# Patient Record
Sex: MALE | Race: WHITE
[De-identification: names, ages, dates, MRNs, and addresses within clinical notes are randomized per-mention and may not be internally consistent; named-entity substitution may affect disease eponyms.]

---

## 2023-01-06 ENCOUNTER — HOSPITAL ENCOUNTER (OUTPATIENT)
Dept: HOSPITAL 47 - LABPAT | Age: 63
Discharge: HOME | End: 2023-01-06
Attending: ORTHOPAEDIC SURGERY
Payer: COMMERCIAL

## 2023-01-06 DIAGNOSIS — M16.12: ICD-10-CM

## 2023-01-06 DIAGNOSIS — Z01.812: Primary | ICD-10-CM

## 2023-01-06 LAB
ALBUMIN SERPL-MCNC: 4.6 G/DL (ref 3.8–4.9)
ALBUMIN/GLOB SERPL: 2.23 G/DL (ref 1.6–3.17)
ALP SERPL-CCNC: 43 U/L (ref 41–126)
ALT SERPL-CCNC: 29 U/L (ref 10–49)
ANION GAP SERPL CALC-SCNC: 8.9 MMOL/L (ref 10–18)
APTT BLD: 24.3 SEC (ref 22–30)
AST SERPL-CCNC: 21 U/L (ref 14–35)
BUN SERPL-SCNC: 18.9 MG/DL (ref 9–27)
BUN/CREAT SERPL: 19.23 RATIO (ref 12–20)
CALCIUM SPEC-MCNC: 9.4 MG/DL (ref 8.7–10.3)
CHLORIDE SERPL-SCNC: 104 MMOL/L (ref 96–109)
CO2 SERPL-SCNC: 28.7 MMOL/L (ref 20–27.5)
ERYTHROCYTE [DISTWIDTH] IN BLOOD BY AUTOMATED COUNT: 4.57 X 10*6/UL (ref 4.4–5.6)
ERYTHROCYTE [DISTWIDTH] IN BLOOD: 13.2 % (ref 11.5–14.5)
GLOBULIN SER CALC-MCNC: 2.1 G/DL (ref 1.6–3.3)
GLUCOSE SERPL-MCNC: 79 MG/DL (ref 70–110)
HCT VFR BLD AUTO: 45.1 % (ref 39.6–50)
HGB BLD-MCNC: 14.6 G/DL (ref 13–17)
INR PPP: 1 (ref ?–1.2)
MCH RBC QN AUTO: 31.9 PG (ref 27–32)
MCHC RBC AUTO-ENTMCNC: 32.4 G/DL (ref 32–37)
MCV RBC AUTO: 98.7 FL (ref 80–97)
NRBC BLD AUTO-RTO: 0 /100 WBCS (ref 0–0)
PLATELET # BLD AUTO: 218 X 10*3/UL (ref 140–440)
POTASSIUM SERPL-SCNC: 4.4 MMOL/L (ref 3.5–5.5)
PROT SERPL-MCNC: 6.7 G/DL (ref 6.2–8.2)
PT BLD: 10.3 SEC (ref 9–12)
SODIUM SERPL-SCNC: 141 MMOL/L (ref 135–145)
WBC # BLD AUTO: 9.11 X 10*3/UL (ref 4.5–10)

## 2023-01-06 PROCEDURE — 85610 PROTHROMBIN TIME: CPT

## 2023-01-06 PROCEDURE — 87070 CULTURE OTHR SPECIMN AEROBIC: CPT

## 2023-01-06 PROCEDURE — 85027 COMPLETE CBC AUTOMATED: CPT

## 2023-01-06 PROCEDURE — 85730 THROMBOPLASTIN TIME PARTIAL: CPT

## 2023-01-06 PROCEDURE — 81003 URINALYSIS AUTO W/O SCOPE: CPT

## 2023-01-06 PROCEDURE — 80053 COMPREHEN METABOLIC PANEL: CPT

## 2023-01-07 LAB
PH UR: 6 [PH] (ref 5–8)
SP GR UR: 1.03 (ref 1–1.03)
UROBILINOGEN UR QL: 0.2

## 2023-01-16 ENCOUNTER — HOSPITAL ENCOUNTER (OUTPATIENT)
Dept: HOSPITAL 47 - OR | Age: 63
Discharge: HOME HEALTH | End: 2023-01-16
Attending: ORTHOPAEDIC SURGERY
Payer: COMMERCIAL

## 2023-01-16 VITALS — SYSTOLIC BLOOD PRESSURE: 132 MMHG | DIASTOLIC BLOOD PRESSURE: 75 MMHG | HEART RATE: 79 BPM

## 2023-01-16 VITALS — RESPIRATION RATE: 16 BRPM

## 2023-01-16 VITALS — TEMPERATURE: 97.4 F

## 2023-01-16 DIAGNOSIS — E78.5: ICD-10-CM

## 2023-01-16 DIAGNOSIS — G89.18: ICD-10-CM

## 2023-01-16 DIAGNOSIS — Z79.82: ICD-10-CM

## 2023-01-16 DIAGNOSIS — M16.12: Primary | ICD-10-CM

## 2023-01-16 DIAGNOSIS — N40.0: ICD-10-CM

## 2023-01-16 DIAGNOSIS — Z96.642: ICD-10-CM

## 2023-01-16 DIAGNOSIS — G47.33: ICD-10-CM

## 2023-01-16 DIAGNOSIS — Z79.899: ICD-10-CM

## 2023-01-16 DIAGNOSIS — E66.9: ICD-10-CM

## 2023-01-16 DIAGNOSIS — Z87.891: ICD-10-CM

## 2023-01-16 PROCEDURE — 97161 PT EVAL LOW COMPLEX 20 MIN: CPT

## 2023-01-16 PROCEDURE — 73501 X-RAY EXAM HIP UNI 1 VIEW: CPT

## 2023-01-16 PROCEDURE — 64447 NJX AA&/STRD FEMORAL NRV IMG: CPT

## 2023-01-16 PROCEDURE — 27130 TOTAL HIP ARTHROPLASTY: CPT

## 2023-01-16 PROCEDURE — 86901 BLOOD TYPING SEROLOGIC RH(D): CPT

## 2023-01-16 PROCEDURE — 97530 THERAPEUTIC ACTIVITIES: CPT

## 2023-01-16 PROCEDURE — 76942 ECHO GUIDE FOR BIOPSY: CPT

## 2023-01-16 PROCEDURE — 86850 RBC ANTIBODY SCREEN: CPT

## 2023-01-16 PROCEDURE — 86900 BLOOD TYPING SEROLOGIC ABO: CPT

## 2023-01-16 PROCEDURE — 88300 SURGICAL PATH GROSS: CPT

## 2023-01-16 NOTE — P.OP
Date of Procedure: 01/16/23


Preoperative Diagnosis: 


Severe osteoarthritis left hip


Postoperative Diagnosis: 


Severe osteoarthritis left hip


Procedure(s) Performed: 


Left total hip arthroplasty direct anterior approach


Implants: 


[employer] Polarstem standard size 9


[employer] R3, 3 hole hemispherical acetabular shell, 58 mm


[employer] Reflection 6.5 mm cancellus screw, 20 mm, 25 mm


[employer] R3, XLPE 20 acetabular liner 


[employer] Nephew Oxinium femoral head 36 m, +4


All components were press-fit.


The articulation is Oxinium on polyethylene.


Anesthesia: GETA


Surgeon: Bubba Fowler


Assistant #1: Coby Martinez


Estimated Blood Loss (ml): 500


Pathology: other (Femoral head)


Condition: stable


Disposition: PACU


Indications for Procedure: 


After failure of conservative treatment we discussed the surgical and 

nonsurgical treatment options at length.  Patient wishes to proceed with a total

hip arthroplasty with a direct anterior approach.  Complications specific to 

this procedure were discussed at length, including but not limited to infection,

leg length discrepancy, dislocation, nerve injury, and fracture.  Covid-19 was 

also discussed at length with the patient, and they are aware of the current 

policies and procedures.  The patient was given the option of delaying surgery, 

but they elect to proceed knowing these risks.  Patient is aware of all these 

complications and informed consent was obtained


Operative Findings: 


The operative findings are consistent with severe osteoarthritis of the left hip


Description of Procedure: 


The patient was seen and evaluated in the preoperative area and the consent was 

reviewed.  The operative site was marked with a skin marker.  The patient 

verified the procedure and operative site.  A PENG block was placed by anesth

esia in the preoperative area. The patient was then brought to the operating 

room and given preoperative antibiotics intravenously.  1 g of Tranexamic acid 

was also given intravenously.  A general anesthetic was administered by the 

anesthesia department.   The patient was then placed on the [place] table with the 

bony prominences well-padded.  The hip area was then prepped with a ChloraPrep 

solution and draped in the usual sterile fashion.





A universal timeout was then performed, which confirmed the patient's name, 

surgical site, ALLERGIES, and procedure being performed on the consent.  Next 

the incision site was located at 1 cm distal and 4 cm lateral to the anterior 

superior iliac spine.  The skin and subcutaneous tissues were sharply incised.  

Incision was carefully dissected down to the fascia overlying the tensor fascia 

sanjana muscle.  This fascia was then incised in line with the muscle fibers.  Care

was taken to stay laterally in order to avoid injuring the lateral femoral 

cutaneous nerve.  Next, using blunt finger dissection, the tensor fascia sanjana 

muscle was dissected off its investing fascia.  The muscle was then carefully 

retracted laterally with a cobra retractor over the lateral neck of the femur.  

Next, the circumflex vessels were identified and cauterized using the Aquamantis

device.  The anterior hip capsule was then exposed.  The capsule was then opened

and an inverted T fashion.  The retractors were then placed intracapsularly.  

The retractors were maintained intracapsular throughout the procedure.  The 

proximal femur was then visualized.  Fluoroscopic x-rays were then taken in 

order to evaluate the preoperative leg lengths.  A small amount of traction was 

placed on the leg.





The femoral neck was then osteotomized at the appropriate level above the lesser

trochanter.  A small wedge of bone was then removed from the remaining femoral 

head.  Next, using a corkscrew the femoral head was removed from the acetabulum.

 On gross visual inspection, the femoral head had complete loss of articular 

cartilage and multiple periarticular osteophytes.  The femoral head was then 

measured.  Attention was then turned to the acetabulum.





The acetabulum was exposed and any remaining labrum was excised.  Sequential 

reaming of the acetabulum was performed using fluoroscopic guidance until there 

was a good bed of bleeding cancellus bone.  When the appropriate size was 

reached, a trial was then placed.  The position and fit of the trial was checked

with fluoroscopy.  The trial was then removed.  Then, using fluoroscopic 

guidance, the final implant was impacted at 20 of anteversion and 40 of 

abduction, and fully seated in the acetabulum.  2 screws were then placed in the

acetabulum.  Again fluoroscopy was used to check position of the screws.  Next, 

the liner was then impacted, with a 20 elevated liner located in the anterior 

superior quadrant.  Component locking was confirmed.





Attention was then directed to the femur.  With the aid of the [place] table, the 

femur was externally rotated to approximately 130, extended, and adducted under

the opposite leg.  A side hook was then placed under the proximal femur, and the

side hook elevator was used to elevate the proximal femur while releasing the 

capsule.  Retractors were then placed.  A capsular release was performed, as 

well as a release of the conjoined tendon, which afforded excellent 

visualization of the proximal femur.  Next, a box osteotome was used to 

lateralize the proximal femur.  A  was then used to locate the 

femoral canal.  Sequential broaching was then performed with appropriate size 

which afforded excellent fixation in the proximal femur.  A trial was then 

placed with appropriate head and neck, and the hip was gently reduced with the 

aid of the [place] table.  Fluoroscopy was then used to check position of the 

components, as well as to evaluate the leg lengths and offset.  The leg lengths 

and offset were measured as closely as possible to ensure stability of the hip. 

The hip was then gently dislocated and the trials were then removed.  Final 

implants were then impacted and the hip was again reduced.  Final fluoroscopic 

x-rays confirmed that the components were in anatomic position.  The leg lengths

and offset were measured and were found to coincide with the trial measurements.

 The hip was also taken through range of motion, and found to be stable.





The hip was then copiously irrigated with antibiotic solution with pulsatile 

lavage.  The hip was then irrigated with Irrisept solution.  The soft tissues 

were then injected with a ropivacaine solution.  A second dose of 1 g of 

Tranexamic acid was also given intravenously. 





The fascia was then closed with 2-0 strata fix suture.  The subcutaneous tissue 

was closed with 3-0 Vicryl.  The subcuticular tissue was closed with 3-0 strata 

fix suture.  The skin was then closed with Exofin skin glue.  After the glue and

dried, and Optifoam silver impregnated dressing was applied.  The patient was 

then transferred to the recovery room in stable condition.





The assistant  LEONARDO Pitt was required due to the complexity of surgery, 

and the need for skilled surgical assistant for positioning, draping, exposure, 

retraction, and closure of the wound.

## 2023-01-16 NOTE — XR
EXAMINATION TYPE: XR Hip Limited LT

 

DATE OF EXAM: 1/16/2023 9:19 AM

 

INDICATION: 

Patient age:Male;  62 years old; 

Reason for study: Status post hip surgery, assess surgical alignment; PHH. 

 

COMPARISON: Fluoroscopic images left hip from the same date.

 

TECHNIQUE: The left hip was examined in frontal projection.

 

FINDINGS: Postsurgical changes from left hip arthroplasty. Hardware appears intact with appropriate a
lignment. There is also mild soft tissue swelling. No acute fracture or dislocation.

 

IMPRESSION: 

Postsurgical changes from left hip arthroplasty. Hardware appears intact with appropriate alignment.

## 2023-01-16 NOTE — XR
EXAMINATION TYPE: XR Hip Limited LT

 

DATE OF EXAM: 1/16/2023

 

COMPARISON: NONE

 

HISTORY: post surgical change

 

TECHNIQUE: One view submitted.

 

FINDINGS:

There is postsurgical change in near anatomic alignment.  There is soft tissue edema and emphysema. 

 

IMPRESSION:

1. Postoperative change.  Appears in near-anatomic alignment.

## 2023-01-16 NOTE — FL
EXAMINATION TYPE: FL guidance operating room

 

DATE OF EXAM: 1/16/2023

 

HISTORY: Fluoroscopy  time

 

68 seconds of fluoroscopy provided. 

 

IMPRESSION:

1. Fluoroscopy time.

## 2023-01-17 NOTE — P.ANPRN
Procedure Note - Anesthesia





- Nerve Block Performed


  ** Left Des Single


Time Out Performed: Yes


Date of Procedure: 01/16/23


Procedure Start Time: 06:42


Procedure Stop Time: 06:47


Location of Patient: PreOp


Indication: Acute Post-Operative Pain, Requested by Surgeon


Sedation Type: Sedate with meaningful contact maintained


Preparation: Sterile Prep


Position: Supine


Needle Types: Pajunk


Needle Gauge: 21


Ultrasound used to visualize needle placement: Yes


Ultrasound used to observe medication spread: Yes


Blood Aspirated: No


Pain Paresthesia on Injection Noted: No


Resistance on Injection: Normal


Image Stored and Saved: Yes


Events: Uneventful and Well Tolerated (ropi .5% 20 cc plus dexamethasone 4mg)